# Patient Record
Sex: MALE | Employment: UNEMPLOYED | URBAN - METROPOLITAN AREA
[De-identification: names, ages, dates, MRNs, and addresses within clinical notes are randomized per-mention and may not be internally consistent; named-entity substitution may affect disease eponyms.]

---

## 2019-03-25 NOTE — PROGRESS NOTES
Date of Service: 3/25/2019    Consult Requested By: GWEN    Reason for Consultation: Chronic HCV    History of Present Illness:   Candido Miranda is a 58 y.o.  Pt has a past medical history of diabetes, HTN, CAD s/p stenting.  He is here today for his chronic HCV, treatment naive with associated compensated liver fibrosis/cirrhosis per fibrosure testing.  (CTP class A)     Genotype: 1a    Resistance: unknown   Prior HCV treatment: None   Diagnosed with HCV:  Unknown   Risk factors: Unknown   HCV viral load at start of treatment: 2,270,000  Fibrosis/cirrhosis: 0.86, F4  Child-Herbert-Pacheco Score: 5 (A)    Review Of Systems:  Review of Systems   Constitutional: Negative for chills, fever, malaise/fatigue and weight loss.   HENT: Negative for hearing loss.    Eyes: Negative for blurred vision.   Respiratory: Negative for cough, sputum production and shortness of breath.    Cardiovascular: Negative for chest pain.   Gastrointestinal: Negative for abdominal pain, constipation, diarrhea, nausea and vomiting.   Genitourinary: Negative for dysuria.   Musculoskeletal: Negative for joint pain and myalgias.   Skin: Negative for itching and rash.   Neurological: Negative for headaches.     PMH:   Diabetes   CAD s/p stents   HTN     FAMILY HX:  Grandmother with diabetes    SOCIAL HX:  ETOH: Denies   Tobacco: Quit 2009, prior 1-2 ppd for 30 years  Drug use: Denies, prior cocaine and IVDU  Sexual activity: Denies    Allergies/Intolerances:  Allergies not on file    Other Current Medications:  No current outpatient prescriptions on file.      Most Recent Vital Signs:  Wgt: 178  Hgt:   Temp:  98.3  HR: 66  BP: 134/73  Ox: 95% RA    Physical Exam   This consultation was conducted utilizing secure and encrypted videoconferencing equipment with the assistance of a trained tele-presenter at the originating site.     Physical Exam   Constitutional: He is oriented to person, place, and time and well-developed, well-nourished, and in no  distress.   HENT:   Head: Normocephalic and atraumatic.   Eyes: Pupils are equal, round, and reactive to light. Conjunctivae and EOM are normal.   Cardiovascular: Normal rate, regular rhythm and normal heart sounds.    Pulmonary/Chest: Effort normal and breath sounds normal.   Abdominal: Soft. Bowel sounds are normal. He exhibits no distension and no mass. There is tenderness. There is no rebound and no guarding.   LLQ ttp    Musculoskeletal: Normal range of motion. He exhibits no edema.   Neurological: He is alert and oriented to person, place, and time.   Skin: Skin is warm and dry.   Psychiatric: Affect normal.       Vaccines    There is no immunization history on file for this patient.    HAV- none documented  HBV- none documented  PCN 23 - none documented  TDap - none documented  Influ - 9/28/18     Pertinent Lab Results:    Date  HCV Viral Load  2/28/19 2,270,000    Screening:   HBV 2/28/19 & 3/4/19  - Surface antigen: neg  - Surface antibody IgG: neg (not immune)  - Core antibody IgG: total neg  HAV IgG antibody: total pos (immune)   HIV: neg    CBC  Date  WBC  HGB  PLAT  2/28/19 5.4  14.1  191      LABS  Date  CR/BUN GFR  E-LYTS  2/28/19 0.68/24 >60  WNL gluc 291     Date  PT/INR  TBili  AlkPh  AST ALT Album  2/28/19 10.7/1.o 0.7  133  70 101 4.5    Lipids   Date  Chol Trig HDL VLDL LDL    HgbA1C      Imaging/Studies:    Liver US: 10/5/18 -see scanned report, 9 mm left hepatic lobe 12 mm right hepatic lobe echogenic lesions are nonspecific but is suggestive of hemangioma.  Given this patient's history of hepatitis perhaps definitive catheterization is warranted.  Mild splenomegaly 13.4 cm    EGD: 9/13/18 no varices     ASSESSMENT:     58 y.o.  Pt has a past medical history of diabetes, HTN, CAD s/p stenting.  He is here today for his chronic HCV, treatment naive with associated compensated liver fibrosis/cirrhosis per fibrosure testing.  (CTP class A)       PLAN:     HCV   AASLD recommendation prior to  treatment:    --- Assessment of drug drug interaction: done and no interaction suspected per [place] HEP interaction    --- Counseled patient on proper administration of antibiotics, frequency, food effect, missed doses and crucial importance of adherence.     --- Vaccines:  Hep B series, PCN 23, Tdap, Influenza if avaialable    --- Obtain labs as above and start Epcluza 1 tab daily for planned 12 week course   --- CT A/P to better characterize liver lesions seen in US - if this can be done in the next 2 weeks then hold Eplusa until complete and no concern for cancer- if not then start Epclusa and obtain ASAP     --- 4 weeks from start of therapy: obtain eGFR, creatinine, hepatic function panel (note: A 10x increase in ALT at any time should prompt immediate DC of therapy, if ALT rising then test q2 weeks)   --- HCV RNA quant at 4 weeks from start of therapy and 12 weeks after completing therapy   --- On all new labs and imaging send documentation once otained to [facility] and notify me.   --- Patient with cirrhosis so will need liver US or other dedicated imaging every 6 months     --- Follow-up in 6 weeks     Diabetes  --- Control blood sugars and monitor as uncontrolled diabetes will contribute to liver damage     Paz Davis M.D.

## 2019-03-27 ENCOUNTER — TELEMEDICINE2 (OUTPATIENT)
Dept: VASCULAR LAB | Facility: MEDICAL CENTER | Age: 59
End: 2019-03-27
Attending: INTERNAL MEDICINE
Payer: OTHER GOVERNMENT

## 2019-03-27 DIAGNOSIS — K74.69 COMPENSATED HCV CIRRHOSIS (HCC): ICD-10-CM

## 2019-03-27 DIAGNOSIS — B18.2 CHRONIC HEPATITIS C WITHOUT HEPATIC COMA (HCC): ICD-10-CM

## 2019-03-27 DIAGNOSIS — B19.20 COMPENSATED HCV CIRRHOSIS (HCC): ICD-10-CM

## 2019-03-27 DIAGNOSIS — E13.69 OTHER SPECIFIED DIABETES MELLITUS WITH OTHER SPECIFIED COMPLICATION, UNSPECIFIED WHETHER LONG TERM INSULIN USE (HCC): ICD-10-CM

## 2019-03-27 PROBLEM — E11.9 DIABETES (HCC): Status: ACTIVE | Noted: 2019-03-27

## 2019-03-27 PROCEDURE — 99202 OFFICE O/P NEW SF 15 MIN: CPT | Performed by: INTERNAL MEDICINE

## 2019-03-27 RX ORDER — VELPATASVIR AND SOFOSBUVIR 100; 400 MG/1; MG/1
1 TABLET, FILM COATED ORAL DAILY
Qty: 28 TAB | Refills: 2 | Status: SHIPPED | OUTPATIENT
Start: 2019-03-27 | End: 2019-04-24

## 2019-03-27 ASSESSMENT — ENCOUNTER SYMPTOMS
MYALGIAS: 0
ABDOMINAL PAIN: 0
HEADACHES: 0
BLURRED VISION: 0
VOMITING: 0
FEVER: 0
COUGH: 0
NAUSEA: 0
WEIGHT LOSS: 0
CHILLS: 0
SHORTNESS OF BREATH: 0
SPUTUM PRODUCTION: 0
CONSTIPATION: 0
DIARRHEA: 0

## 2019-04-24 ENCOUNTER — DOCUMENTATION (OUTPATIENT)
Dept: VASCULAR LAB | Facility: MEDICAL CENTER | Age: 59
End: 2019-04-24

## 2019-04-24 NOTE — PROGRESS NOTES
Received information that CT A/P has been done on the patient and is concerning for hepatocellular carcinoma.  He started Epclusa approximately 6 days ago.  Will recommend stopping Epclusa and referral to hepatology regarding any further work-up and treatment for his presumed hepatocellular carcinoma.  Once a definitive plan is in place then reinitiating treatment for his hepatitis C virus can be considered.       Relayed this to [facility] via email and will also ask telemedicine team to forward this documentation.     Paz Davis MD

## 2019-12-18 ENCOUNTER — DOCUMENTATION (OUTPATIENT)
Dept: VASCULAR LAB | Facility: MEDICAL CENTER | Age: 59
End: 2019-12-18

## 2019-12-18 NOTE — PROGRESS NOTES
Reviewed documentation.  Biopsy was reportedly positive for high-grade HCC.  On 10/22/2019, patient underwent catheterization of right hepatic artery with embolization with Yttrium-90.  Plan was for repeat CT imaging in 6 to 8 weeks which should be sometime soon.    Please request and upload the follow-up CT imaging, and schedule follow-up appointment with us in 4 weeks for review and possibly reinitiating treatment for hepatitis C.

## 2020-02-25 NOTE — PROGRESS NOTES
[facility] HEPATITIS C TELECONFERENCE CLINIC NOTE     Date of Service: 2/25/2020    Referring Physician: GWEN    Chief Complaint: Treatment for hepatitis C, hepatocellular carcinoma    History of Present Illness:     Candido Miranda is a 58 y.o.  Pt has a past medical history of diabetes, HTN, CAD s/p stenting.  He is here today for his chronic HCV, treatment naive with associated compensated liver fibrosis/cirrhosis per fibrosure testing.  (CTP class A)     Patient was initially seen in March 2019, liver ultrasound showed liver lesions that were suggestive of hemangioma, had mild splenomegaly.  Patient was started on Epclusa and received this for about 6 days, then received notification that his CT was concerning for hepatocellular carcinoma thus Epclusa was stopped.     Biopsy was positive for high-grade HCC. On 10/22/2019, patient underwent catheterization of right hepatic artery with embolization with Yttrium-90.    Repeat CT obtained 12/17/2019 with redemonstration of cirrhosis and hepatic mass with some improvement following embolization therapy.      Repeat CT obtained 1/7/2020 showed persistent liver mass but interval improvement in enhancement. He had higher AFP thus underwent repeat embolization with Y-90 on 1/27/2020. Plan for repeat CT and IR follow up in March in about 2 weeks from now.  Per patient, if repeat CT shows no satisfactory improvement in the tumor, patient may be started on chemotherapy.    CBC 2/6/2020 with WBC 3.7, Hgb 11.8, Hct 35.5, platelets 175  BUN 20, Cr 0.81  AST 70       Genotype: 1a    Resistance: unknown   Prior HCV treatment: None   Diagnosed with HCV:  Unknown   Risk factors: Unknown   HCV viral load at start of treatment: 2,270,000  Fibrosis/cirrhosis: 0.86, F4  Child-Herbert-Pacheco Score: 5 (A)    Screening:   HBV 2/28/19 & 3/4/19  - Surface antigen: neg  - Surface antibody IgG: neg (not immune) - receiving vaccine series  - Core antibody IgG: total neg  HAV IgG  antibody: total pos (immune)   HIV: neg    Review of Systems:  All other systems reviewed and are negative expect as noted in HPI     PMH:   Diabetes   CAD s/p stents   HTN      FAMILY HX:  Grandmother with diabetes     SOCIAL HX:  ETOH: Denies   Tobacco: Quit 2009, prior 1-2 ppd for 30 years  Drug use: Denies, prior cocaine and IVDU  Sexual activity: Denies    Social History     Socioeconomic History   • Marital status: Unknown     Spouse name: Not on file   • Number of children: Not on file   • Years of education: Not on file   • Highest education level: Not on file   Occupational History   • Not on file   Social Needs   • Financial resource strain: Not on file   • Food insecurity     Worry: Not on file     Inability: Not on file   • Transportation needs     Medical: Not on file     Non-medical: Not on file   Tobacco Use   • Smoking status: Not on file   Substance and Sexual Activity   • Alcohol use: Not on file   • Drug use: Not on file   • Sexual activity: Not on file   Lifestyle   • Physical activity     Days per week: Not on file     Minutes per session: Not on file   • Stress: Not on file   Relationships   • Social connections     Talks on phone: Not on file     Gets together: Not on file     Attends Christianity service: Not on file     Active member of club or organization: Not on file     Attends meetings of clubs or organizations: Not on file     Relationship status: Not on file   • Intimate partner violence     Fear of current or ex partner: Not on file     Emotionally abused: Not on file     Physically abused: Not on file     Forced sexual activity: Not on file   Other Topics Concern   • Not on file   Social History Narrative   • Not on file       Allergies not on file    Medications:  Amlodipine  Aspirin  Atenolol  Plavix  Glipizide  SSI  Humulin  Lantus  HCTZ  Loratidine  Metformin  Pregabalin    Physical Exam:   This consultation was conducted utilizing secure and encrypted videoconferencing equipment  with the assistance of a trained tele-presenter at the originating site.     Vital Signs: /57 T 96.5 HR 76 o2 99% Wt 172  Vital signs reviewed  Constitutional: Patient is oriented to person, place, and time. No distress  Head: Atraumatic, normocephalic  Eyes: Conjunctivae normal, EOM intact.  Mouth/Throat: Lips without lesions,oropharynx is clear and moist.  Neck: Neck supple. No masses/lymphadenopathy  Cardiovascular: Normal rate, regular rhythm, normal S1S2 and intact distal pulses. No murmur, gallop, or friction rub. No pedal edema.  Pulmonary/Chest: No respiratory distress. Unlabored respiratory effort, lungs clear to auscultation. No wheezes or rales.   Abdominal: Soft, non tender, protuberant. BS + x 4. No masses or hepatosplenomegaly.   Musculoskeletal: No joint tenderness, swelling, erythema, or restriction of motion noted.  Neurological: Alert and oriented to person, place, and time. No gross cranial nerve deficit.   Skin: Skin is warm and dry. No rashes or embolic phenomena noted on exposed skin  Psychiatric: Normal mood and affect. Behavior is normal.     LABS:  No results found for: WBC, RBC, HEMOGLOBIN, HEMATOCRIT, MCV, MCH, MCHC, MPV  No results found for: SODIUM, POTASSIUM, CHLORIDE, CO2, GLUCOSE, BUN, CREATININE, BUNCREATRAT, GLOMRATE  No results found for: ALKPHOSPHAT, ASTSGOT, ALTSGPT, TBILIRUBIN   No results found for: CPKTOTAL     MICRO:  No results found for: BLOODCULTU, BLDCULT, BCHOLD      Latest pertinent labs were reviewed    IMAGING STUDIES:  As above    Assessment:   58 y.o.  Pt has a past medical history of diabetes, HTN, CAD s/p stenting.  Patient is here for treatment of his hepatitis C, recently diagnosed to have high-grade hepatocellular carcinoma, received Y-90 embolization x2, per patient plan is repeat CT and if no satisfactory improvement may receive chemotherapy.      PLAN:   HCV, hepatocellular carcinoma, pancytopenia:  -Liver mass persists on imaging, some improvement in  enhancement, received Y-90 embolization x2.  Per patient, plan is repeat CT in March and if no satisfactory improvement, may receive chemotherapy  -Timing of HCV treatment in this patient is complex.  Resolution of tumor or decrease in tumor burden theoretically improve the chances of achieving cure.  In addition, he will need both Epclusa and ribavirin.  He already has pancytopenia so he may not be able to tolerate a full 12-week course of ribavirin  -Recommend scheduling his repeat CT scan and IR appointment 2 weeks from now followed by follow-up appointment with us so we have a tentative plan for the HCC and can reassess timing of HCV therapy    Return to ID clinic in 2 weeks    Jalil Gurrola M.D.    Please note that this dictation was created using voice recognition software. I have worked with technical experts from [facility]  [employer] to optimize the interface.  I have made every reasonable attempt to correct obvious errors, but there may be errors of grammar and possibly content that I did not discover before finalizing the note.

## 2020-02-26 ENCOUNTER — TELEMEDICINE2 (OUTPATIENT)
Dept: VASCULAR LAB | Facility: MEDICAL CENTER | Age: 60
End: 2020-02-26
Attending: INTERNAL MEDICINE
Payer: OTHER GOVERNMENT

## 2020-02-26 DIAGNOSIS — C22.0 CANCER, HEPATOCELLULAR (HCC): ICD-10-CM

## 2020-02-26 DIAGNOSIS — D61.818 PANCYTOPENIA (HCC): ICD-10-CM

## 2020-02-26 DIAGNOSIS — B18.2 CHRONIC HEPATITIS C WITHOUT HEPATIC COMA (HCC): ICD-10-CM

## 2020-02-26 PROCEDURE — 99214 OFFICE O/P EST MOD 30 MIN: CPT | Performed by: INTERNAL MEDICINE

## 2020-03-12 ENCOUNTER — APPOINTMENT (OUTPATIENT)
Dept: VASCULAR LAB | Facility: MEDICAL CENTER | Age: 60
End: 2020-03-12
Attending: INTERNAL MEDICINE
Payer: OTHER GOVERNMENT

## 2020-04-12 DIAGNOSIS — D61.818 PANCYTOPENIA (HCC): ICD-10-CM

## 2020-04-12 DIAGNOSIS — C22.0 CANCER, HEPATOCELLULAR (HCC): ICD-10-CM

## 2020-04-12 DIAGNOSIS — B18.2 CHRONIC HEPATITIS C WITHOUT HEPATIC COMA (HCC): ICD-10-CM

## 2020-04-12 RX ORDER — VELPATASVIR AND SOFOSBUVIR 100; 400 MG/1; MG/1
1 TABLET, FILM COATED ORAL DAILY
Qty: 84 TAB | Refills: 0 | Status: SHIPPED | OUTPATIENT
Start: 2020-04-12

## 2020-04-12 NOTE — PROGRESS NOTES
Repeat CT from 3/12 reviewed with complete response to HCC treatment, no residual disease per report. Will initiate hepatitis C treatment with close monitoring:    -Please obtain repeat CBC w/ diff, CMP, INR  -Okay to start PO Epclusa 1 tab daily + PO ribavirin 600 mg BID x 2 weeks  -Repeat CBC w/ diff every 2 weeks for as long as patient is on ribavirin  -Repeat CMP and HCV viral load (quantitivate PCR) at week 4  -Repeat HCV viral load (quantitivate PCR) at week 12  -Repeat HCV viral load (quantitivate PCR) 12 weeks after completion of therapy    Please inform us of above labs every 2 weeks.    Discussed with Larissa Garcia

## 2020-04-27 ENCOUNTER — TELEMEDICINE2 (OUTPATIENT)
Dept: VASCULAR LAB | Facility: MEDICAL CENTER | Age: 60
End: 2020-04-27
Attending: INTERNAL MEDICINE
Payer: OTHER GOVERNMENT

## 2020-04-27 DIAGNOSIS — Z79.4 TYPE 2 DIABETES MELLITUS WITH OTHER SPECIFIED COMPLICATION, WITH LONG-TERM CURRENT USE OF INSULIN (HCC): ICD-10-CM

## 2020-04-27 DIAGNOSIS — E11.69 TYPE 2 DIABETES MELLITUS WITH OTHER SPECIFIED COMPLICATION, WITH LONG-TERM CURRENT USE OF INSULIN (HCC): ICD-10-CM

## 2020-04-27 DIAGNOSIS — B18.2 CHRONIC HEPATITIS C WITHOUT HEPATIC COMA (HCC): ICD-10-CM

## 2020-04-27 DIAGNOSIS — C22.0 CANCER, HEPATOCELLULAR (HCC): ICD-10-CM

## 2020-04-27 PROBLEM — D63.8 ANEMIA, CHRONIC DISEASE: Status: ACTIVE | Noted: 2020-02-26

## 2020-04-27 PROCEDURE — 99214 OFFICE O/P EST MOD 30 MIN: CPT | Performed by: INTERNAL MEDICINE

## 2020-04-27 NOTE — PROGRESS NOTES
[facility] HEPATITIS C TELECONFERENCE CLINIC NOTE     Date of Service: 4/26/2020    Referring Physician: GWEN    Chief Complaint: Follow-up for hepatitis C and HCC    History of Present Illness:     Candido Miranda is a 58 y.o.  Pt has a past medical history of diabetes, HTN, CAD s/p stenting.  He is here today for his chronic HCV, treatment naive with associated compensated liver fibrosis/cirrhosis per fibrosure testing.  (CTP class A)      Patient was initially seen in March 2019, liver ultrasound showed liver lesions that were suggestive of hemangioma, had mild splenomegaly.  Patient was started on Epclusa and received this for about 6 days, then received notification that his CT was concerning for hepatocellular carcinoma thus Epclusa was stopped.      Biopsy was positive for high-grade HCC. On 10/22/2019, patient underwent catheterization of right hepatic artery with embolization with Yttrium-90.     Repeat CT obtained 12/17/2019 with redemonstration of cirrhosis and hepatic mass with some improvement following embolization therapy.       Repeat CT obtained 1/7/2020 showed persistent liver mass but interval improvement in enhancement. He had higher AFP thus underwent repeat embolization with Y-90 on 1/27/2020. Plan for repeat CT and IR follow up in March in about 2 weeks from now.  Per patient, if repeat CT shows no satisfactory improvement in the tumor, patient may be started on chemotherapy.    Repeat CT scan 3/12 showed complete response to ECT treatment, no residual disease per report.  CBC with differential was repeated and plan is to start Epclusa and ribavirin.     CBC 4/16/2020 but WBC 4.4, hemoglobin 11.0, platelets 287    CBC 2/6/2020 with WBC 3.7, Hgb 11.8, Hct 35.5, platelets 175  BUN 20, Cr 0.81  AST 70       Genotype: 1a    Resistance: unknown   Prior HCV treatment: None   Diagnosed with HCV:  Unknown   Risk factors: Unknown   HCV viral load at start of treatment:  2,270,000  Fibrosis/cirrhosis: 0.86, F4  Child-Herbert-Pacheco Score: 5 (A)     Screening:   HBV 2/28/19 & 3/4/19  - Surface antigen: neg  - Surface antibody IgG: neg (not immune) - receiving vaccine series  - Core antibody IgG: total neg  HAV IgG antibody: total pos (immune)   HIV: neg    Review of Systems:  All other systems reviewed and are negative expect as noted in HPI    PMH:   Diabetes   CAD s/p stents   HTN      FAMILY HX:  Grandmother with diabetes     SOCIAL HX:  ETOH: Denies   Tobacco: Quit 2009, prior 1-2 ppd for 30 years  Drug use: Denies, prior cocaine and IVDU  Sexual activity: Denies    Social History     Socioeconomic History   • Marital status: Unknown     Spouse name: Not on file   • Number of children: Not on file   • Years of education: Not on file   • Highest education level: Not on file   Occupational History   • Not on file   Social Needs   • Financial resource strain: Not on file   • Food insecurity     Worry: Not on file     Inability: Not on file   • Transportation needs     Medical: Not on file     Non-medical: Not on file   Tobacco Use   • Smoking status: Not on file   Substance and Sexual Activity   • Alcohol use: Not on file   • Drug use: Not on file   • Sexual activity: Not on file   Lifestyle   • Physical activity     Days per week: Not on file     Minutes per session: Not on file   • Stress: Not on file   Relationships   • Social connections     Talks on phone: Not on file     Gets together: Not on file     Attends Baptism service: Not on file     Active member of club or organization: Not on file     Attends meetings of clubs or organizations: Not on file     Relationship status: Not on file   • Intimate partner violence     Fear of current or ex partner: Not on file     Emotionally abused: Not on file     Physically abused: Not on file     Forced sexual activity: Not on file   Other Topics Concern   • Not on file   Social History Narrative   • Not on file       Not on  File    Medications:  Current Outpatient Medications on File Prior to Visit   Medication Sig Dispense Refill   • Sofosbuvir-Velpatasvir (EPCLUSA) 400-100 MG Tab Take 1 Tab by mouth every day. 84 Tab 0   • ribavirin (RIBATAB) 600 MG tablet Take 1 Tab by mouth 2 times a day for 84 days. 168 Tab 0     No current facility-administered medications on file prior to visit.    amlodipine 10  Aspirin 81  Atenolol 50 BID  Clopidogrel 75   Humulin R sliding scale  HCTZ 25  lantus 20 - 0 - 35  Metformin 1g   Oxycodone 10 BID  pregabalin    Physical Exam:   This consultation was conducted utilizing secure and encrypted videoconferencing equipment with the assistance of a trained tele-presenter at the originating site.   Vital Signs: T 98.2 /53 HR 80 o2 98% Wt 160  Vital signs reviewed  Constitutional: Patient is oriented to person, place, and time. Appears thin. No distress  Head: Atraumatic, normocephalic  Eyes: Conjunctivae normal, EOM intact.   Mouth/Throat: Lips without lesions, poor dentition, oropharynx is clear and moist.  Neck: Neck supple. No masses/lymphadenopathy  Cardiovascular: Normal rate, regular rhythm, normal S1S2 and intact distal pulses. No murmur, gallop, or friction rub. No pedal edema.  Pulmonary/Chest: No respiratory distress. Unlabored respiratory effort, lungs clear to auscultation. No wheezes or rales.   Abdominal: Soft, mild generalized TTP. BS + x 4. No masses  Musculoskeletal: No joint tenderness, swelling, erythema, or restriction of motion noted.  Neurological: Alert and oriented to person, place, and time. No gross cranial nerve deficit.   Skin: Skin is warm and dry. No rashes or embolic phenomena noted on exposed skin  Psychiatric: Normal mood and affect. Behavior is normal.     LABS:  No results found for: WBC, RBC, HEMOGLOBIN, HEMATOCRIT, MCV, MCH, MCHC, MPV  No results found for: SODIUM, POTASSIUM, CHLORIDE, CO2, GLUCOSE, BUN, CREATININE, BUNCREATRAT, GLOMRATE  No results found for:  ALKPHOSPHAT, ASTSGOT, ALTSGPT, TBILIRUBIN   No results found for: CPKTOTAL     MICRO:  No results found for: BLOODCULTU, BLDCULT, BCHOLD      Latest pertinent labs were reviewed    IMAGING STUDIES:  As above    Assessment:   58 y.o.  Pt has a past medical history of diabetes, HTN, CAD s/p stenting.  Patient is here for treatment of his hepatitis C, recently diagnosed to have high-grade hepatocellular carcinoma, received Y-90 embolization x2, repeat CT from 3/12 with complete response to ECT treatment, no residual disease per report     PLAN:   HCV, hepatocellular carcinoma, anemia:  -Given good improvement with OTC therapy as above, will initiate Epclusa plus ribavirin  -Okay to start PO Epclusa 1 tab daily + PO ribavirin 600 mg BID x 12 weeks  -Repeat CBC w/ diff every 2 weeks for as long as patient is on ribavirin  -Medication interactions: Epclusa may enhance effects of hypoglycemic agents.  Monitor closely for hypoglycemia  -Repeat CMP and HCV viral load (quantitivate PCR) at week 4  -Repeat HCV viral load (quantitivate PCR) at week 12  -Repeat HCV viral load (quantitivate PCR) 12 weeks after completion of therapy  -Plan for his HCC is repeat CT in the near future and reassessment per remote RN      Please inform us of above labs every 2 weeks.    Return to clinic at end of therapy or sooner if issues arise    Jalil Gurrola M.D.    Please note that this dictation was created using voice recognition software. I have worked with technical experts from [facility] to optimize the interface.  I have made every reasonable attempt to correct obvious errors, but there may be errors of grammar and possibly content that I did not discover before finalizing the note.

## 2020-06-03 ENCOUNTER — DOCUMENTATION (OUTPATIENT)
Dept: VASCULAR LAB | Facility: MEDICAL CENTER | Age: 60
End: 2020-06-03

## 2020-06-03 NOTE — PROGRESS NOTES
Update:    Epclusa + ribavirin started 4/29.    Monitoring labs reviewed:    Hgb was 11.0 on 4/16 --> treatment started 4/29 --> Hgb 9.7 on 5/21 --> 9.4 on 5/28    HCV PCR undetectable on 5/28    Given worsening anemia and undetectable HCV PCR at the 4-week berenice, will stop ribavirin and continue with just the Epclusa.    Continue CBC q2 weeks.    Expected stop date of Epclusa is 7/21/2020

## 2020-06-18 ENCOUNTER — DOCUMENTATION (OUTPATIENT)
Dept: INFECTIOUS DISEASES | Facility: MEDICAL CENTER | Age: 60
End: 2020-06-18

## 2020-06-18 NOTE — PROGRESS NOTES
Update:    Hemoglobin now improved to 11.2 on 6/12/2020.  Continue with just Epclusa.  Expected stop date of 7/21/2020.    Discussed with Larissa Garcia RN

## 2020-08-19 ENCOUNTER — TELEMEDICINE2 (OUTPATIENT)
Dept: VASCULAR LAB | Facility: MEDICAL CENTER | Age: 60
End: 2020-08-19
Attending: INTERNAL MEDICINE
Payer: OTHER GOVERNMENT

## 2020-08-19 DIAGNOSIS — B18.2 CHRONIC HEPATITIS C WITHOUT HEPATIC COMA (HCC): ICD-10-CM

## 2020-08-19 DIAGNOSIS — C22.0 CANCER, HEPATOCELLULAR (HCC): ICD-10-CM

## 2020-08-19 PROCEDURE — 99213 OFFICE O/P EST LOW 20 MIN: CPT | Performed by: INTERNAL MEDICINE

## 2020-08-19 NOTE — PROGRESS NOTES
[facility] HEPATITIS C TELECONFERENCE CLINIC NOTE     Date of Service: 8/18/2020    Referring Physician: GWEN    Chief Complaint: Follow-up for hepatitis C and HCC    History of Present Illness:     Candido Miranda is a 58 y.o.  Pt has a past medical history of diabetes, HTN, CAD s/p stenting.  He is here today for his chronic HCV, treatment naive with associated compensated liver fibrosis/cirrhosis per fibrosure testing.  (CTP class A)      Patient was initially seen in March 2019, liver ultrasound showed liver lesions that were suggestive of hemangioma, had mild splenomegaly.  Patient was started on Epclusa and received this for about 6 days, then received notification that his CT was concerning for hepatocellular carcinoma thus Epclusa was stopped.      Biopsy was positive for high-grade HCC. On 10/22/2019, patient underwent catheterization of right hepatic artery with embolization with Yttrium-90.     Repeat CT obtained 12/17/2019 with redemonstration of cirrhosis and hepatic mass with some improvement following embolization therapy.       Repeat CT obtained 1/7/2020 showed persistent liver mass but interval improvement in enhancement. He had higher AFP thus underwent repeat embolization with Y-90 on 1/27/2020.     Repeat CT scan 3/12 showed complete response to ECT treatment, no residual disease per report.  CBC with differential was repeated and patient was started on Epclusa and ribavirin.  Ribavirin had to be held due to drop in hemoglobin, but patient completed Epclusa.  Patient currently reports lots of energy now, jumped up on the exam table, in great spirits.     CBC 4/16/2020 but WBC 4.4, hemoglobin 11.0, platelets 287     CBC 2/6/2020 with WBC 3.7, Hgb 11.8, Hct 35.5, platelets 175  BUN 20, Cr 0.81  AST 70       Genotype: 1a    Resistance: unknown   Prior HCV treatment: None   Diagnosed with HCV:  Unknown   Risk factors: Unknown   HCV viral load at start of treatment:  2,270,000  Fibrosis/cirrhosis: 0.86, F4  Child-Herbert-Pacheco Score: 5 (A)     Screening:   HBV 2/28/19 & 3/4/19  - Surface antigen: neg  - Surface antibody IgG: neg (not immune) - receiving vaccine series  - Core antibody IgG: total neg  HAV IgG antibody: total pos (immune)   HIV: neg     Review of Systems:  All other systems reviewed and are negative expect as noted in HPI     PMH:   Diabetes   CAD s/p stents   HTN      FAMILY HX:  Grandmother with diabetes     SOCIAL HX:  ETOH: Denies   Tobacco: Quit 2009, prior 1-2 ppd for 30 years  Drug use: Denies, prior cocaine and IVDU  Sexual activity: Denies      Medications:  Current Outpatient Medications on File Prior to Visit   Medication Sig Dispense Refill   • Sofosbuvir-Velpatasvir (EPCLUSA) 400-100 MG Tab Take 1 Tab by mouth every day. 84 Tab 0     No current facility-administered medications on file prior to visit.    amlodipine 10  Aspirin 81  Atenolol 50 BID  Clopidogrel 75   Humulin R sliding scale  HCTZ 25  lantus 20 - 0 - 35  Metformin 1g   Oxycodone 10 BID  pregabalin    Physical Exam:   This consultation was conducted utilizing secure and encrypted videoconferencing equipment with the assistance of a trained tele-presenter at the originating site.   Vital Signs: Wt 162 lbs o2 97.4 97% HR 87 /76  Vital signs reviewed  Constitutional: Patient is oriented to person, place, and time. Appears well-developed and well-nourished. No distress  Head: Atraumatic, normocephalic  Eyes: Conjunctivae normal, EOM intact.   Mouth/Throat: Lips without lesions, poor dentition  Neck: Neck supple. No masses/lymphadenopathy  Cardiovascular: Normal rate, regular rhythm, normal S1S2 and intact distal pulses. No murmur, gallop, or friction rub. No pedal edema.  Pulmonary/Chest: No respiratory distress. Unlabored respiratory effort, lungs clear to auscultation. No wheezes or rales.   Abdominal: Soft, non tender, protuberant. BS + x 4. No masses or hepatosplenomegaly.    Musculoskeletal: No joint tenderness, swelling, erythema, or restriction of motion noted.  Neurological: Alert and oriented to person, place, and time. No gross cranial nerve deficit. No focal neural deficit noted  Skin: Skin is warm and dry. No rashes or embolic phenomena noted on exposed skin  Psychiatric: In good spirits.  Normal mood and affect. Behavior is normal.     LABS:  No results found for: WBC, RBC, HEMOGLOBIN, HEMATOCRIT, MCV, MCH, MCHC, MPV  No results found for: SODIUM, POTASSIUM, CHLORIDE, CO2, GLUCOSE, BUN, CREATININE, BUNCREATRAT, GLOMRATE  No results found for: ALKPHOSPHAT, ASTSGOT, ALTSGPT, TBILIRUBIN   No results found for: CPKTOTAL     MICRO:  No results found for: BLOODCULTU, BLDCULT, BCHOLD      Latest pertinent labs were reviewed    IMAGING STUDIES:  As above    Assessment:   58 y.o.  Pt has a past medical history of diabetes, HTN, CAD s/p stenting.  Patient is here for treatment of his hepatitis C, recently diagnosed to have high-grade hepatocellular carcinoma, received Y-90 embolization x2, repeat CT from 3/12 with complete response to treatment, no residual disease per report     PLAN:   HCV, hepatocellular carcinoma, anemia:  -Given good improvement with HCC therapy as above, initiated Epclusa plus ribavirin on 4/29/2020 -completed 12 weeks on 7/21/2020  -Prior to starting medications, patient's hemoglobin was 11.0 on 4/16 --> treatment started 4/29 --> Hgb 9.7 on 5/21 --> 9.4 on 5/28  -HCV quantitative PCR undetectable on 5/28/2020  -Thus, on 6/3/2020, ribavirin was discontinued and Epclusa was continued by itself  -Repeat CBC on 6/12/2020 with improvement in hemoglobin back up to 11.2 --> 12.3 on 8/1/2020  -Repeat HCV viral load (quantitivate PCR) 12 weeks after completion of therapy (around 10/12/2020)  -Plan for his HCC is repeat CT in the near future and reassessment per remote RN    No ID clinic follow-up required but please send us the repeat HCV viral load quantitative PCR as  above    Jalil Gurrola M.D.    Please note that this dictation was created using voice recognition software. I have worked with technical experts from [facility] to optimize the interface.  I have made every reasonable attempt to correct obvious errors, but there may be errors of grammar and possibly content that I did not discover before finalizing the note.